# Patient Record
Sex: MALE | Race: OTHER | Employment: UNEMPLOYED | URBAN - METROPOLITAN AREA
[De-identification: names, ages, dates, MRNs, and addresses within clinical notes are randomized per-mention and may not be internally consistent; named-entity substitution may affect disease eponyms.]

---

## 2017-03-17 ENCOUNTER — OFFICE VISIT (OUTPATIENT)
Dept: INTERNAL MEDICINE CLINIC | Age: 52
End: 2017-03-17

## 2017-03-17 VITALS
RESPIRATION RATE: 16 BRPM | BODY MASS INDEX: 24.95 KG/M2 | OXYGEN SATURATION: 98 % | TEMPERATURE: 97.8 F | WEIGHT: 164.6 LBS | DIASTOLIC BLOOD PRESSURE: 64 MMHG | HEIGHT: 68 IN | SYSTOLIC BLOOD PRESSURE: 108 MMHG | HEART RATE: 48 BPM

## 2017-03-17 DIAGNOSIS — Z00.00 PHYSICAL EXAM: ICD-10-CM

## 2017-03-17 DIAGNOSIS — M77.8 RIGHT ELBOW TENDINITIS: Primary | ICD-10-CM

## 2017-03-17 DIAGNOSIS — L40.9 PSORIASIS: ICD-10-CM

## 2017-03-17 DIAGNOSIS — B86 SCABIES INFESTATION: ICD-10-CM

## 2017-03-17 RX ORDER — PREDNISONE 20 MG/1
TABLET ORAL
Qty: 13 TAB | Refills: 0 | Status: SHIPPED | OUTPATIENT
Start: 2017-03-17 | End: 2022-06-24

## 2017-03-17 RX ORDER — AMPICILLIN TRIHYDRATE 250 MG
600 CAPSULE ORAL
COMMUNITY
End: 2022-06-24

## 2017-03-17 RX ORDER — CALCIPOTRIENE 50 UG/G
OINTMENT TOPICAL 2 TIMES DAILY
Qty: 60 G | Refills: 0 | Status: SHIPPED | OUTPATIENT
Start: 2017-03-17 | End: 2022-06-24

## 2017-03-17 RX ORDER — GLUCOSAMINE SULFATE 1500 MG
POWDER IN PACKET (EA) ORAL DAILY
COMMUNITY
End: 2022-06-24

## 2017-03-17 RX ORDER — GLUCOSAM/CHONDRO/HERB 149/HYAL 750-100 MG
TABLET ORAL
COMMUNITY
End: 2022-06-24

## 2017-03-17 RX ORDER — PERMETHRIN 92 %
LIQUID (GRAM) MISCELLANEOUS
Qty: 1 G | Refills: 1 | Status: SHIPPED | OUTPATIENT
Start: 2017-03-17 | End: 2022-06-24

## 2017-03-17 NOTE — PROGRESS NOTES
Chief Complaint   Patient presents with    Complete Physical     patient has scabbies just like wife. wife was seen and is being seen for the same problem.

## 2017-03-17 NOTE — PROGRESS NOTES
Written by Vikki Cisneros, as dictated by Dr. Meir Bull MD.    Edna Moe is a 54 y.o. male. HPI  The patient comes in today C/O rash due to scabies that he has from his wife. He has been using cream from his wife but no relief , in fact rash is spreading. 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 He has had pain before in his L elbow and I gave him Naproxen and steroids when his Naproxen did not help his pain. He also has a quarter size rash on the front of his leg, that was on the back of his leg as well. He has seen similar rash in the past but now it  is staying longer. 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 pylori infection A04.8        Current Outpatient Prescriptions on File Prior to Visit   Medication Sig Dispense Refill    methylPREDNISolone (MEDROL DOSEPACK) 4 mg tablet As directed. 1 Dose Pack 0     No current facility-administered medications on file prior to visit. No Known Allergies    History reviewed. No pertinent past medical history. History reviewed. No pertinent surgical history. Family History   Problem Relation Age of Onset    Asthma Mother        Social History     Social History    Marital status:      Spouse name: N/A    Number of children: N/A    Years of education: N/A     Occupational History    Not on file. Social History Main Topics    Smoking status: Never Smoker    Smokeless tobacco: Not on file    Alcohol use No    Drug use: No    Sexual activity: Yes     Partners: Female     Other Topics Concern    Not on file     Social History Narrative         Review of Systems   Constitutional: Negative for malaise/fatigue. HENT: Negative for congestion. Respiratory: Negative for cough and wheezing. Cardiovascular: Negative for chest pain and palpitations.    Musculoskeletal: Positive for joint pain. Negative for myalgias. Skin: Positive for itching and rash. Neurological: Negative for weakness and headaches. Visit Vitals    /64 (BP 1 Location: Right arm, BP Patient Position: Sitting)    Pulse (!) 48    Temp 97.8 °F (36.6 °C) (Oral)    Resp 16    Ht 5' 8\" (1.727 m)    Wt 164 lb 9.6 oz (74.7 kg)    SpO2 98%    BMI 25.03 kg/m2     Physical Exam   Constitutional: He is oriented to person, place, and time. He appears well-nourished. No distress. HENT:   Right Ear: External ear normal.   Left Ear: External ear normal.   Mouth/Throat: Oropharynx is clear and moist.   Eyes: Conjunctivae and EOM are normal. Right eye exhibits no discharge. Left eye exhibits no discharge. Neck: Normal range of motion. Neck supple. Cardiovascular: Normal rate and regular rhythm. Pulmonary/Chest: Effort normal and breath sounds normal. He has no wheezes. Abdominal: Soft. Bowel sounds are normal. He exhibits no distension. Lymphadenopathy:     He has no cervical adenopathy. Neurological: He is alert and oriented to person, place, and time. Skin: Rash noted. There is erythema. pruritic erythematous rash   Psychiatric: He has a normal mood and affect. Nursing note and vitals reviewed. ASSESSMENT and PLAN    ICD-10-CM ICD-9-CM    1. Right elbow tendinitis M77.8 727.09 predniSONE (DELTASONE) 20 mg tablet sent to pharmacy     I will give him steroids. I want the patient to take the medication po as follows: 3 pills x 2 days, 2 pills x 2 days, 1 pill x 1 day and 1/2 pill x 1 day. The patient was advised to take this medication with food   2. Physical exam Z00.00 V70.9 LIPID PANEL      CBC W/O DIFF      METABOLIC PANEL, COMPREHENSIVE      TSH 3RD GENERATION   3. Psoriasis L40.9 696.1 calcipotriene (DOVONOX) 0.005 % ointment sent to pharmacy    I will give him a cream to use on this rash BID. I discussed that I am concerned about psoriasis since he also has a Hx of joint pain.  I discussed if that helps him with the rash then it is psoriasis and we will need to make a long term plan. 4. Scabies infestation B86 133.0 Permethrin, Bulk, 92 % liqd sent to pharmacy    I want him to get rid of the scabies while using this wash that he has to put on his body and then take a shower 6-8 hours later. This plan was reviewed with the patient and patient agrees. All questions were answered. This scribe documentation was reviewed by me and accurately reflects the examination and decisions made by me. This note will not be viewable in [address]

## 2017-03-17 NOTE — MR AVS SNAPSHOT
Visit Information Date & Time Provider Department Dept. Phone Encounter #  
 3/17/2017 11:45 AM Naya Ann MD [facility] Internal Medicine [phone] [id number] Your Appointments 3/17/2017 11:45 AM  
ROUTINE CARE with Naya Ann MD  
[facility] Internal Medicine [address] [facility]) Appt Note: pain in leg, spot is getting bigger [facility] [address] [place] [id number] Upcoming Health Maintenance Date Due DTaP/Tdap/Td series (1 - Tdap) 10/4/1988 Allergies as of 3/17/2017  Review Complete On: 3/17/2017 By: Naya Ann MD  
 No Known Allergies Current Immunizations  Never Reviewed No immunizations on file. Not reviewed this visit You Were Diagnosed With   
  
 Codes Comments Right elbow tendinitis    -  Primary ICD-10-CM: M77.8 ICD-9-CM: 727.09 Physical exam     ICD-10-CM: Z00.00 ICD-9-CM: V70.9 Psoriasis     ICD-10-CM: L40.9 ICD-9-CM: 696.1 Scabies infestation     ICD-10-CM: B86 
ICD-9-CM: 133.0 Vitals BP Pulse Temp Resp Height(growth percentile) Weight(growth percentile) 108/64 (BP 1 Location: Right arm, BP Patient Position: Sitting) (!) 48 97.8 °F (36.6 °C) (Oral) 16 5' 8\" (1.727 m) 164 lb 9.6 oz (74.7 kg) SpO2 BMI Smoking Status 98% 25.03 kg/m2 Never Smoker BMI and BSA Data Body Mass Index Body Surface Area 25.03 kg/m 2 1.89 m 2 Preferred Pharmacy Pharmacy Name Phone [facility]ACY #[id number]- [facility], [address] [place] [address] [phone] Your Updated Medication List  
  
   
This list is accurate as of: 3/17/17  9:22 AM.  Always use your most recent med list.  
  
  
  
  
 calcipotriene 0.005 % ointment Commonly known as:  Lorel Castellanos Apply  to affected area two (2) times a day. Omega-3-DHA-EPA-Fish Oil 1,000 mg (120 mg-180 mg) Cap Take  by mouth. Permethrin (Bulk) 92 % Liqd Apply daily X 3 days. predniSONE 20 mg tablet Commonly known as:  Mirtha Carson Prednisone 60 mg x 2 days, 40 mg po x 2 days, 20 mg po x 2 days, 10 mg po x 1 day. red yeast rice extract 600 mg Cap Take 600 mg by mouth now. VITAMIN D3 1,000 unit Cap Generic drug:  cholecalciferol Take  by mouth daily. Prescriptions Sent to Pharmacy Refills Permethrin, Bulk, 92 % liqd 1 Sig: Apply daily X 3 days. Class: Normal  
 Pharmacy: [facility]/pharmacy #[id number], [address] [place] [address] Ph #: [phone]  
 predniSONE (DELTASONE) 20 mg tablet 0 Sig: Prednisone 60 mg x 2 days, 40 mg po x 2 days, 20 mg po x 2 days, 10 mg po x 1 day. Class: Normal  
 Pharmacy: [facility]/pharmacy #[id number], [address] [place] [address] Ph #: [phone]  
 calcipotriene (DOVONOX) 0.005 % ointment 0 Sig: Apply  to affected area two (2) times a day. Class: Normal  
 Pharmacy: [facility]/pharmacy #[id number], [address] [place] [address] Ph #: [phone] Route: Topical  
  
We Performed the Following CBC W/O DIFF [49141 CPT(R)] LIPID PANEL [51918 CPT(R)] METABOLIC PANEL, COMPREHENSIVE [23099 CPT(R)] TSH 3RD GENERATION [63808 CPT(R)] Introducing [facility] & HEALTH SERVICES! [place] introduces Nexi patient portal. Now you can access parts of your medical record, email your doctor's office, and request medication refills online. 1. In your internet browser, go to [email or web address] 2. Click on the First Time User? Click Here link in the Sign In box. You will see the New Member Sign Up page. 3. Enter your Nexi Access Code exactly as it appears below. You will not need to use this code after youve completed the sign-up process. If you do not sign up before the expiration date, you must request a new code. · OVGuide Access Code: [email or web address] Expires: 6/15/2017  9:21 AM 
 
4. Enter the last four digits of your Social Security Number (xxxx) and Date of Birth (mm/dd/yyyy) as indicated and click Submit. You will be taken to the next sign-up page. 5. Create a OVGuide ID. This will be your OVGuide login ID and cannot be changed, so think of one that is secure and easy to remember. 6. Create a OVGuide password. You can change your password at any time. 7. Enter your Password Reset Question and Answer. This can be used at a later time if you forget your password. 8. Enter your e-mail address. You will receive e-mail notification when new information is available in [address] 9. Click Sign Up. You can now view and download portions of your medical record. 10. Click the Download Summary menu link to download a portable copy of your medical information. If you have questions, please visit the Frequently Asked Questions section of the OVGuide website. Remember, OVGuide is NOT to be used for urgent needs. For medical emergencies, dial 911. Now available from your iPhone and Android! Please provide this summary of care documentation to your next provider. Your primary care clinician is listed as Myriam Rayo. If you have any questions after today's visit, please call [phone].

## 2017-03-18 LAB
ALBUMIN SERPL-MCNC: 4.3 G/DL (ref 3.5–5.5)
ALBUMIN/GLOB SERPL: 1.9 {RATIO} (ref 1.2–2.2)
ALP SERPL-CCNC: 60 IU/L (ref 39–117)
ALT SERPL-CCNC: 31 IU/L (ref 0–44)
AST SERPL-CCNC: 24 IU/L (ref 0–40)
BILIRUB SERPL-MCNC: 0.4 MG/DL (ref 0–1.2)
BUN SERPL-MCNC: 19 MG/DL (ref 6–24)
BUN/CREAT SERPL: 20 (ref 9–20)
CALCIUM SERPL-MCNC: 8.9 MG/DL (ref 8.7–10.2)
CHLORIDE SERPL-SCNC: 102 MMOL/L (ref 96–106)
CHOLEST SERPL-MCNC: 165 MG/DL (ref 100–199)
CO2 SERPL-SCNC: 26 MMOL/L (ref 18–29)
CREAT SERPL-MCNC: 0.93 MG/DL (ref 0.76–1.27)
ERYTHROCYTE [DISTWIDTH] IN BLOOD BY AUTOMATED COUNT: 14 % (ref 12.3–15.4)
GLOBULIN SER CALC-MCNC: 2.3 G/DL (ref 1.5–4.5)
GLUCOSE SERPL-MCNC: 95 MG/DL (ref 65–99)
HCT VFR BLD AUTO: 43.7 % (ref 37.5–51)
HDLC SERPL-MCNC: 38 MG/DL
HGB BLD-MCNC: 14.6 G/DL (ref 12.6–17.7)
INTERPRETATION, 910389: NORMAL
LDLC SERPL CALC-MCNC: 89 MG/DL (ref 0–99)
MCH RBC QN AUTO: 30 PG (ref 26.6–33)
MCHC RBC AUTO-ENTMCNC: 33.4 G/DL (ref 31.5–35.7)
MCV RBC AUTO: 90 FL (ref 79–97)
PLATELET # BLD AUTO: 199 X10E3/UL (ref 150–379)
POTASSIUM SERPL-SCNC: 4.5 MMOL/L (ref 3.5–5.2)
PROT SERPL-MCNC: 6.6 G/DL (ref 6–8.5)
RBC # BLD AUTO: 4.86 X10E6/UL (ref 4.14–5.8)
SODIUM SERPL-SCNC: 140 MMOL/L (ref 134–144)
TRIGL SERPL-MCNC: 191 MG/DL (ref 0–149)
TSH SERPL DL<=0.005 MIU/L-ACNC: 2.35 UIU/ML (ref 0.45–4.5)
VLDLC SERPL CALC-MCNC: 38 MG/DL (ref 5–40)
WBC # BLD AUTO: 5.9 X10E3/UL (ref 3.4–10.8)

## 2017-03-22 NOTE — PROGRESS NOTES
Spoke with wife who is on HIPPA release form,  She voiced understanding and no concerns at this time.

## 2017-03-28 DIAGNOSIS — R21 RASH: Primary | ICD-10-CM

## 2017-03-29 ENCOUNTER — OFFICE VISIT (OUTPATIENT)
Dept: INTERNAL MEDICINE CLINIC | Age: 52
End: 2017-03-29

## 2017-03-29 VITALS
HEIGHT: 68 IN | WEIGHT: 148.8 LBS | DIASTOLIC BLOOD PRESSURE: 62 MMHG | OXYGEN SATURATION: 98 % | SYSTOLIC BLOOD PRESSURE: 110 MMHG | TEMPERATURE: 98.3 F | BODY MASS INDEX: 22.55 KG/M2 | HEART RATE: 62 BPM | RESPIRATION RATE: 16 BRPM

## 2017-03-29 DIAGNOSIS — G89.29 ELBOW PAIN, CHRONIC, RIGHT: ICD-10-CM

## 2017-03-29 DIAGNOSIS — M25.521 ELBOW PAIN, CHRONIC, RIGHT: ICD-10-CM

## 2017-03-29 DIAGNOSIS — L40.9 PSORIASIS: Primary | ICD-10-CM

## 2017-03-29 NOTE — MR AVS SNAPSHOT
Visit Information Date & Time Provider Department Dept. Phone Encounter #  
 3/29/2017  2:45 PM Kevin Lee MD [facility] Internal Medicine [phone] [id number] Upcoming Health Maintenance Date Due DTaP/Tdap/Td series (1 - Tdap) 10/4/1988 Allergies as of 3/29/2017  Review Complete On: 3/29/2017 By: Kevin Lee MD  
 No Known Allergies Current Immunizations  Never Reviewed No immunizations on file. Not reviewed this visit You Were Diagnosed With   
  
 Codes Comments Psoriasis    -  Primary ICD-10-CM: L40.9 ICD-9-CM: 696.1 Elbow pain, chronic, right     ICD-10-CM: M25.521, G89.29 ICD-9-CM: 719.42, 338.29 Vitals BP Pulse Temp Resp Height(growth percentile) Weight(growth percentile) 110/62 (BP 1 Location: Left arm, BP Patient Position: Sitting) 62 98.3 °F (36.8 °C) (Oral) 16 5' 8\" (1.727 m) 148 lb 12.8 oz (67.5 kg) SpO2 BMI Smoking Status 98% 22.62 kg/m2 Never Smoker BMI and BSA Data Body Mass Index Body Surface Area  
 22.62 kg/m 2 1.8 m 2 Preferred Pharmacy Pharmacy Name Phone [id number]- Eliezer Xiong, [facility] [place] [address] [phone] Your Updated Medication List  
  
   
This list is accurate as of: 3/29/17  3:05 PM.  Always use your most recent med list.  
  
  
  
  
 calcipotriene 0.005 % ointment Commonly known as:  Toyin Barrier Apply  to affected area two (2) times a day. Omega-3-DHA-EPA-Fish Oil 1,000 mg (120 mg-180 mg) Cap Take  by mouth. Permethrin (Bulk) 92 % Liqd Apply daily X 3 days. predniSONE 20 mg tablet Commonly known as:  Rocky Levine Prednisone 60 mg x 2 days, 40 mg po x 2 days, 20 mg po x 2 days, 10 mg po x 1 day. red yeast rice extract 600 mg Cap Take 600 mg by mouth now. VITAMIN D3 1,000 unit Cap Generic drug:  cholecalciferol Take  by mouth daily. We Performed the Following REFERRAL TO RHEUMATOLOGY [VAH24 Custom] Comments:  
 Please evaluate patient for possible psoriasis Referral Information Referral ID Referred By Referred To  
  
 [id number] Abby FRANKS MD   
   [address], [address] Phone: [phone] Fax: [phone] Visits Status Start Date End Date 1 New Request 3/29/17 3/29/18 If your referral has a status of pending review or denied, additional information will be sent to support the outcome of this decision. Introducing [facility] & HEALTH SERVICES! Crystal Baig introduces Money On Mobile patient portal. Now you can access parts of your medical record, email your doctor's office, and request medication refills online. 1. In your internet browser, go to [email or web address] 2. Click on the First Time User? Click Here link in the Sign In box. You will see the New Member Sign Up page. 3. Enter your Money On Mobile Access Code exactly as it appears below. You will not need to use this code after youve completed the sign-up process. If you do not sign up before the expiration date, you must request a new code. · [employer] On Mobile Access Code: [email or web address] Expires: 6/15/2017  9:21 AM 
 
4. Enter the last four digits of your Social Security Number (xxxx) and Date of Birth (mm/dd/yyyy) as indicated and click Submit. You will be taken to the next sign-up page. 5. Create a Money On Mobile ID. This will be your Money On Mobile login ID and cannot be changed, so think of one that is secure and easy to remember. 6. Create a Money On Mobile password. You can change your password at any time. 7. Enter your Password Reset Question and Answer. This can be used at a later time if you forget your password. 8. Enter your e-mail address. You will receive e-mail notification when new information is available in [address] 9. Click Sign Up. You can now view and download portions of your medical record. 10. Click the Download Summary menu link to download a portable copy of your medical information. If you have questions, please visit the Frequently Asked Questions section of the [employer] website. Remember, [employer] is NOT to be used for urgent needs. For medical emergencies, dial 911. Now available from your iPhone and Android! Please provide this summary of care documentation to your next provider. Your primary care clinician is listed as Bennett Mancera. If you have any questions after today's visit, please call [phone].

## 2017-03-29 NOTE — PROGRESS NOTES
Written by Audi Richard, as dictated by Dr. Yogesh Cai MD.  Translation was provided by Brandon Reese, certified , [employer] employee. HISTORY OF PRESENT ILLNESS  Donnell Paul is a 54 y.o. male. HPI  The patient comes in today C/O rash. He has been having a lot of itching. Recall he and his wife came in and they were diagnosed with scabies and given premarin wash. His wife is no longer having itching. He has itching and rash on his R leg and L arm. He used the Dovonox cream which helped the rash and then 4 days ago it came back. He uses the Dovonex cream every morning and every night. 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 pylori infection A04.8        Current Outpatient Prescriptions on File Prior to Visit   Medication Sig Dispense Refill    red yeast rice extract 600 mg cap Take 600 mg by mouth now.  cholecalciferol (VITAMIN D3) 1,000 unit cap Take  by mouth daily.  Omega-3-DHA-EPA-Fish Oil 1,000 mg (120 mg-180 mg) cap Take  by mouth.  Permethrin, Bulk, 92 % liqd Apply daily X 3 days. 1 g 1    predniSONE (DELTASONE) 20 mg tablet Prednisone 60 mg x 2 days, 40 mg po x 2 days, 20 mg po x 2 days, 10 mg po x 1 day. 13 Tab 0    calcipotriene (DOVONOX) 0.005 % ointment Apply  to affected area two (2) times a day. 60 g 0     No current facility-administered medications on file prior to visit. No Known Allergies    History reviewed. No pertinent past medical history. History reviewed. No pertinent surgical history. Family History   Problem Relation Age of Onset    Asthma Mother        Social History     Social History    Marital status:      Spouse name: N/A    Number of children: N/A    Years of education: N/A     Occupational History    Not on file.      Social History Main Topics    Smoking status: Never Smoker    Smokeless tobacco: Not on file    Alcohol use No    Drug use: No    Sexual activity: Yes     Partners: Female     Other Topics Concern    Not on file     Social History Narrative       Office Visit on 03/17/2017   Component Date Value Ref Range Status    Cholesterol, total 03/17/2017 165  100 - 199 mg/dL Final    Triglyceride 03/17/2017 191* 0 - 149 mg/dL Final    HDL Cholesterol 03/17/2017 38* >39 mg/dL Final    VLDL, calculated 03/17/2017 38  5 - 40 mg/dL Final    LDL, calculated 03/17/2017 89  0 - 99 mg/dL Final    WBC 03/17/2017 5.9  3.4 - 10.8 x10E3/uL Final    RBC 03/17/2017 4.86  4.14 - 5.80 x10E6/uL Final    HGB 03/17/2017 14.6  12.6 - 17.7 g/dL Final    HCT 03/17/2017 43.7  37.5 - 51.0 % Final    MCV 03/17/2017 90  79 - 97 fL Final    MCH 03/17/2017 30.0  26.6 - 33.0 pg Final    MCHC 03/17/2017 33.4  31.5 - 35.7 g/dL Final    RDW 03/17/2017 14.0  12.3 - 15.4 % Final    PLATELET 17/60/1135 124  150 - 379 x10E3/uL Final    Glucose 03/17/2017 95  65 - 99 mg/dL Final    BUN 03/17/2017 19  6 - 24 mg/dL Final    Creatinine 03/17/2017 0.93  0.76 - 1.27 mg/dL Final    GFR est non-AA 03/17/2017 96  >59 mL/min/1.73 Final    GFR est AA 03/17/2017 111  >59 mL/min/1.73 Final    BUN/Creatinine ratio 03/17/2017 20  9 - 20 Final    Sodium 03/17/2017 140  134 - 144 mmol/L Final    Potassium 03/17/2017 4.5  3.5 - 5.2 mmol/L Final    Chloride 03/17/2017 102  96 - 106 mmol/L Final    CO2 03/17/2017 26  18 - 29 mmol/L Final    Calcium 03/17/2017 8.9  8.7 - 10.2 mg/dL Final    Protein, total 03/17/2017 6.6  6.0 - 8.5 g/dL Final    Albumin 03/17/2017 4.3  3.5 - 5.5 g/dL Final    GLOBULIN, TOTAL 03/17/2017 2.3  1.5 - 4.5 g/dL Final    A-G Ratio 03/17/2017 1.9  1.2 - 2.2 Final                  **Please note reference interval change**    Bilirubin, total 03/17/2017 0.4  0.0 - 1.2 mg/dL Final    Alk.  phosphatase 03/17/2017 60  39 - 117 IU/L Final    AST (SGOT) 03/17/2017 24  0 - 40 IU/L Final    ALT (SGPT) 03/17/2017 31  0 - 44 IU/L Final    TSH 03/17/2017 2.350  0.450 - 4.500 uIU/mL Final    INTERPRETATION 03/17/2017 Note   Final    Supplement report is available. Review of Systems   Constitutional: Negative for malaise/fatigue. HENT: Negative for congestion. Respiratory: Negative for cough and wheezing. Cardiovascular: Negative for chest pain and palpitations. Musculoskeletal: Positive for joint pain. Negative for myalgias. Skin: Positive for itching and rash. Neurological: Negative for weakness and headaches. Visit Vitals    /62 (BP 1 Location: Left arm, BP Patient Position: Sitting)    Pulse 62    Temp 98.3 °F (36.8 °C) (Oral)    Resp 16    Ht 5' 8\" (1.727 m)    Wt 148 lb 12.8 oz (67.5 kg)    SpO2 98%    BMI 22.62 kg/m2     Physical Exam   Constitutional: He is oriented to person, place, and time. He appears well-nourished. No distress. HENT:   Right Ear: External ear normal.   Left Ear: External ear normal.   Mouth/Throat: Oropharynx is clear and moist.   Eyes: Conjunctivae and EOM are normal. Right eye exhibits no discharge. Left eye exhibits no discharge. Neck: Normal range of motion. Neck supple. Cardiovascular: Normal rate and regular rhythm. Pulmonary/Chest: Effort normal and breath sounds normal. He has no wheezes. Abdominal: Soft. Bowel sounds are normal. He exhibits no distension. Lymphadenopathy:     He has no cervical adenopathy. Neurological: He is alert and oriented to person, place, and time. Skin: Rash noted. Psoriatic, scaly, raised, erythematous rash on L leg and R arm   Psychiatric: He has a normal mood and affect. Nursing note and vitals reviewed. ASSESSMENT and PLAN    ICD-10-CM ICD-9-CM    1. Psoriasis L40.9 696.1 REFERRAL TO RHEUMATOLOGY    I discussed that he can use 1% hydrocortisone cream during the day between the Dovonox cream. I discussed that he has a skin condition and autoimmune disease, Psoriasis. The rash and joint pain is due to the Psoriasis.  The rash is treated with the cream. I want him to follow with rheumatologist to help manage the psoriasis sxs. 2. Elbow pain, chronic, right M25.521 719.42 REFERRAL TO RHEUMATOLOGY    G89.29 338.29   I discussed that his joint pain can also be explained by this autoimmune disease. This plan was reviewed with the patient and patient agrees. All questions were answered. This scribe documentation was reviewed by me and accurately reflects the examination and decisions made by me. This note will not be viewable in [address]

## 2017-03-29 NOTE — PROGRESS NOTES
Chief Complaint   Patient presents with    Rash     patient still has rash, was seen last week for scabbies. states that it is still having problems.

## 2017-04-10 ENCOUNTER — DOCUMENTATION ONLY (OUTPATIENT)
Dept: INTERNAL MEDICINE CLINIC | Age: 52
End: 2017-04-10

## 2017-04-10 NOTE — PROGRESS NOTES
Wife called for  states he needs pain medication for his elbow, writer explained this office does not provide chronic pain medication, if this patient needs further medication he would need another appointment. Writer offered x3 to make an appointment for this patient all attempts were refused by wife.

## 2017-05-03 ENCOUNTER — TELEPHONE (OUTPATIENT)
Dept: RHEUMATOLOGY | Age: 52
End: 2017-05-03

## 2017-05-09 ENCOUNTER — TELEPHONE (OUTPATIENT)
Dept: RHEUMATOLOGY | Age: 52
End: 2017-05-09

## 2022-05-13 PROCEDURE — 99283 EMERGENCY DEPT VISIT LOW MDM: CPT

## 2022-05-14 ENCOUNTER — HOSPITAL ENCOUNTER (EMERGENCY)
Age: 57
Discharge: HOME | End: 2022-05-14
Attending: EMERGENCY MEDICINE
Payer: OTHER MISCELLANEOUS

## 2022-05-14 VITALS
HEIGHT: 67 IN | DIASTOLIC BLOOD PRESSURE: 75 MMHG | BODY MASS INDEX: 22.76 KG/M2 | HEART RATE: 61 BPM | RESPIRATION RATE: 18 BRPM | TEMPERATURE: 98 F | SYSTOLIC BLOOD PRESSURE: 121 MMHG | OXYGEN SATURATION: 95 % | WEIGHT: 145 LBS

## 2022-05-14 DIAGNOSIS — K40.90 UNILATERAL INGUINAL HERNIA WITHOUT OBSTRUCTION OR GANGRENE, RECURRENCE NOT SPECIFIED: Primary | ICD-10-CM

## 2022-05-14 RX ORDER — MELOXICAM 15 MG/1
15 TABLET ORAL
Qty: 15 TABLET | Refills: 0 | Status: SHIPPED | OUTPATIENT
Start: 2022-05-14 | End: 2022-06-24

## 2022-05-14 NOTE — Clinical Note
Angelina Adair was seen and treated in our emergency department on 5/13/2022.     He will can return to work starting on 5/16/2022 with the following restrictions until 6/1/2022:  No lifting, carrying, pulling, or pushing objects greater than 40 pounds above the level of the waist    Brit Morton NP

## 2022-05-14 NOTE — ROUTINE PROCESS
Emergency Room Nursing Note        Patient Name: Deepak Taylor      : 1965             MRN: [id number]      Chief Complaint: Abdominal Pain      Admit Diagnosis: No admission diagnoses are documented for this encounter. Surgery: * No surgery found *            MD reviewed discharge instructions and options with patient. Patient verbalized understanding. RN reviewed discharge instructions using teach back method. Patient ambulatory to exit without difficulty and no acute signs of distress. No complaints or needs expressed at this time. Patient counseled on medications prescribed at discharge (If prescribed). Vital signs stable. Patient to follow up with PCP/Specialist on the next business day for appointment. All questions answered by ER RN.           Lines:        Vitals: Patient Vitals for the past 12 hrs:   Temp Pulse Resp BP SpO2   22 0327 98 °F (36.7 °C) 61 18 121/75 95 %   22 2248 98.2 °F (36.8 °C) (!) 57 18 110/69 94 %         Signed by: Shiva Crespo RN, ELIUD, BSN, [facility]                                              2022 at 3:28 AM

## 2022-05-14 NOTE — ED TRIAGE NOTES
Patient arrived to the ER ambulatory from home with complaints of abdominal that started after he was catching a falling ladder that happened at work.

## 2022-05-14 NOTE — ED PROVIDER NOTES
16-year-old male presents the ER today for evaluation of abdominal pain. Patient states that earlier today he was in the active catching a falling ladder when he developed a sensation of right lower abdominal pain associated with a palpable bulge. He states that he laid down and relaxed and the bulge and pain quickly subsided. He denies a history of similar symptoms. No past medical history on file. No past surgical history on file. Family History:   Problem Relation Age of Onset    Asthma Mother        Social History     Socioeconomic History    Marital status:      Spouse name: Not on file    Number of children: Not on file    Years of education: Not on file    Highest education level: Not on file   Occupational History    Not on file   Tobacco Use    Smoking status: Never Smoker    Smokeless tobacco: Not on file   Substance and Sexual Activity    Alcohol use: No    Drug use: No    Sexual activity: Yes     Partners: Female   Other Topics Concern    Not on file   Social History Narrative    Not on file     Social Determinants of Health     Financial Resource Strain:     Difficulty of Paying Living Expenses: Not on file   Food Insecurity:     Worried About Running Out of Food in the Last Year: Not on file    Baljeet of Food in the Last Year: Not on file   Transportation Needs:     Lack of Transportation (Medical): Not on file    Lack of Transportation (Non-Medical):  Not on file   Physical Activity:     Days of Exercise per Week: Not on file    Minutes of Exercise per Session: Not on file   Stress:     Feeling of Stress : Not on file   Social Connections:     Frequency of Communication with Friends and Family: Not on file    Frequency of Social Gatherings with Friends and Family: Not on file    Attends Mormonism Services: Not on file    Active Member of Clubs or Organizations: Not on file    Attends Club or Organization Meetings: Not on file    Marital Status: Not on file   Intimate Partner Violence:     Fear of Current or Ex-Partner: Not on file    Emotionally Abused: Not on file    Physically Abused: Not on file    Sexually Abused: Not on file   Housing Stability:     Unable to Pay for Housing in the Last Year: Not on file    Number of [place] in the Last Year: Not on file    Unstable Housing in the Last Year: Not on file         ALLERGIES: Patient has no known allergies. Review of Systems   Constitutional: Negative for fever. HENT: Negative for sore throat. Eyes: Negative for visual disturbance. Respiratory: Negative for shortness of breath. Cardiovascular: Negative for palpitations. Gastrointestinal: Positive for abdominal pain. Negative for vomiting. Genitourinary: Negative for dysuria. Musculoskeletal: Negative for myalgias. Skin: Negative for rash. Neurological: Negative for dizziness. Psychiatric/Behavioral: Negative for dysphoric mood. Vitals:    05/13/22 2248   BP: 110/69   Pulse: (!) 57   Resp: 18   Temp: 98.2 °F (36.8 °C)   SpO2: 94%   Weight: 65.8 kg (145 lb)   Height: 5' 7\" (1.702 m)            Physical Exam  Vitals and nursing note reviewed. Constitutional:       Appearance: Normal appearance. HENT:      Head: Normocephalic. Eyes:      Extraocular Movements: Extraocular movements intact. Cardiovascular:      Rate and Rhythm: Normal rate. Pulmonary:      Effort: Pulmonary effort is normal.   Abdominal:      General: Abdomen is flat. There is no distension. Tenderness: There is no abdominal tenderness. Hernia: A hernia is present. Hernia is present in the right inguinal area. Comments: Palpable nontender hernia present in the right inguinal region. No erythema to skin. No warmth to palpation. Musculoskeletal:         General: Normal range of motion. Cervical back: Neck supple. Skin:     General: Skin is warm and dry. Coloration: Skin is not pale.    Neurological:      Mental Status: He is alert and oriented to person, place, and time. Gait: Gait normal.   Psychiatric:         Behavior: Behavior normal.          MDM      VITAL SIGNS:  No data found. LABS:  No results found for this or any previous visit (from the past 6 hour(s)). IMAGING:  No orders to display         Medications During Visit:  Medications - No data to display      DECISION MAKING:  Andrea Bernardo is a 49 y.o. male who comes in as above. Physical exam reveals a nontender non incarcerated right inguinal hernia. I suspect that the patient has probably had a hernia for quite some time and the action of catching the falling ladder caused it to temporarily worsen until it later self reduced. Patient does not have a family doctor or medical insurance. Recommended follow-up with one of the provided free clinics for initial management who can refer him to surgery as needed. Discussed return precautions for signs of incarceration, including worsening pain, erythema, warmth, fevers, nausea/vomiting, etc.    The clinical decision making for this encounter included ordering and interpreting the above diagnostic tests with comparison to prior studies that are within our EMR. Past medical and surgical histories were reviewed, as were records from recent outpatient and emergency department visits. The above results discussed and reviewed with the patient. Patient verbalized understanding of the care plan, including any changes to current outpatient medication regimen, discussed disease process, symptom control, and follow-up care. Return precautions reviewed. IMPRESSION:  1. Unilateral inguinal hernia without obstruction or gangrene, recurrence not specified        DISPOSITION:  Discharged      Current Discharge Medication List      START taking these medications    Details   meloxicam (MOBIC) 15 mg tablet Take 1 Tablet by mouth daily as needed for Pain.   Qty: 15 Tablet, Refills: 0  Start date: 5/14/2022              Follow-up Information     Follow up With Specialties Details Why Contact Info    [facility]    [address] [place] [address]    [address]    [address] [facility] [address]  [phone]    [place] [address]  [phone]    [facility]    [address] [facility] [address]  [phone]            The patient is asked to follow-up with their primary care provider in the next several days. They are to call tomorrow for an appointment. The patient is asked to return promptly for any increased concerns or worsening of symptoms. They can return to this emergency department or any other emergency department.       Procedures

## 2022-05-14 NOTE — Clinical Note
Rosita Hunt was seen and treated in our emergency department on 5/13/2022.     He will can return to work starting on 5/16/2022 with the following restrictions until 6/1/2022:  No lifting, carrying, pulling, or pushing objects greater than 40 pounds above the level of the waist    Stacey Rock NP

## 2022-05-18 ENCOUNTER — TELEPHONE (OUTPATIENT)
Dept: SURGERY | Age: 57
End: 2022-05-18

## 2022-05-18 ENCOUNTER — OFFICE VISIT (OUTPATIENT)
Dept: SURGERY | Age: 57
End: 2022-05-18
Payer: OTHER MISCELLANEOUS

## 2022-05-18 VITALS
TEMPERATURE: 98.3 F | OXYGEN SATURATION: 94 % | BODY MASS INDEX: 23.86 KG/M2 | SYSTOLIC BLOOD PRESSURE: 97 MMHG | HEIGHT: 67 IN | HEART RATE: 64 BPM | RESPIRATION RATE: 18 BRPM | WEIGHT: 152 LBS | DIASTOLIC BLOOD PRESSURE: 62 MMHG

## 2022-05-18 DIAGNOSIS — K40.90 RIGHT INGUINAL HERNIA: Primary | ICD-10-CM

## 2022-05-18 PROCEDURE — 99202 OFFICE O/P NEW SF 15 MIN: CPT | Performed by: SURGERY

## 2022-05-18 RX ORDER — DEXTROMETHORPHAN HYDROBROMIDE, GUAIFENESIN 5; 100 MG/5ML; MG/5ML
650 LIQUID ORAL EVERY 8 HOURS
COMMUNITY
End: 2022-06-24

## 2022-05-18 NOTE — LETTER
5/18/2022    Patient: Magali Hernandez   YOB: 1965   Date of Visit: 5/18/2022     Radha Lopez MD  [address] [place] [address]  [facility] [place]    Dear Radha Lopez MD,      Thank you for referring Mr. Magali Hernandez to [facility] for evaluation. My notes for this consultation are attached. If you have questions, please do not hesitate to call me. I look forward to following your patient along with you.       Sincerely,    Cinthia Razo MD

## 2022-05-18 NOTE — PROGRESS NOTES
Subjective:      Daisha Sandoval  is a 49 y.o. male who presents for evaluation of RT inguinal hernia. Pt requires tele- today. Pt reports catching falling ladder at work and feeling a pain to the RT groin. Pt endorses immediately noticing a small bulge in the area. He reports pain when coughing or sneezing. Pt was seen and assessed in the ED on 5/13/22 and referred to office. Past Medical History:   Diagnosis Date    Right inguinal hernia 5/18/2022       No past surgical history on file. Social History     Tobacco Use    Smoking status: Never Smoker    Smokeless tobacco: Never Used   Substance Use Topics    Alcohol use: No       Family History   Problem Relation Age of Onset    Asthma Mother        Current Outpatient Medications on File Prior to Visit   Medication Sig Dispense Refill    acetaminophen (TYLENOL) 650 mg TbER Take 650 mg by mouth every eight (8) hours.  meloxicam (MOBIC) 15 mg tablet Take 1 Tablet by mouth daily as needed for Pain. (Patient not taking: Reported on 5/18/2022) 15 Tablet 0    red yeast rice extract 600 mg cap Take 600 mg by mouth now. (Patient not taking: Reported on 5/18/2022)      cholecalciferol (VITAMIN D3) 1,000 unit cap Take  by mouth daily. (Patient not taking: Reported on 5/18/2022)      Omega-3-DHA-EPA-Fish Oil 1,000 mg (120 mg-180 mg) cap Take  by mouth. (Patient not taking: Reported on 5/18/2022)      Permethrin, Bulk, 92 % liqd Apply daily X 3 days. (Patient not taking: Reported on 5/18/2022) 1 g 1    predniSONE (DELTASONE) 20 mg tablet Prednisone 60 mg x 2 days, 40 mg po x 2 days, 20 mg po x 2 days, 10 mg po x 1 day. (Patient not taking: Reported on 5/18/2022) 13 Tab 0    calcipotriene (DOVONOX) 0.005 % ointment Apply  to affected area two (2) times a day. (Patient not taking: Reported on 5/18/2022) 60 g 0     No current facility-administered medications on file prior to visit.        No Known Allergies    Review of Systems:  Constitutional: No fever or chills  Neurologic: No headache  Eyes: No scleral icterus or irritated eyes  Nose: No nasal pain or drainage  Mouth: No oral lesions or sore throat  Cardiac: No palpations or chest pain  Pulmonary: No cough or shortness or breath  Gastrointestinal: No nausea, emesis, diarrhea, or constipation  Genitourinary: No dysuria  Musculoskeletal: No muscle or joint tenderness  Skin: No rashes or lesions  Psychiatric: No anxiety or depressed mood    Objective:     Visit Vitals  BP 97/62   Pulse 64   Temp 98.3 °F (36.8 °C) (Oral)   Resp 18   Ht 5' 7\" (1.702 m)   Wt 152 lb (68.9 kg)   SpO2 94%   BMI 23.81 kg/m²        Physical Exam:  General: No acute distress, conversant  Eyes: PERRLA, no scleral icterus  HENT: Normocephalic without oral lesions  Neck: Trachea midline without LAD  Cardiac: Normal pulse rate and rhythm  Pulmonary: Symmetric chest rise with normal effort  Abdomen: easily reducible, very tender, small RT inguinal hernia  Skin: Warm without rash  Extremities: No edema or joint stiffness  Psych: Appropriate mood and affect    Labs: No results found for this or any previous visit (from the past 24 hour(s)). Data Review: All previous imaging, testing and lab work was reviewed and interpreted. Assessment and Plan:       ICD-10-CM ICD-9-CM    1. Right inguinal hernia  K40.90 550.90        Discussed their diagnosis thoroughly. Noted that the presence of a hernia is not a determining factor when considering surgical repair. Due to the natural progression of a hernia, this will not heal on its own and may continue to increase in size over time. Since this hernia is bothersome, recommend surgical repair as an outpatient, with possible mesh placement. Described the details of this surgery and discussed what the patient should expect for recovery. Pt should avoid any heavy lifting for 10-14 days post-operation. All questions were answered.  They agree with this plan and will schedule this at their convenience. Total face to face time with patient: 15 minutes. Greater than 50% of the time was spent in counseling. This note was scribed by Dora Loges as dictated by Dr. Marianne Connelly.      Signed By: Pina Huber MD     05/18/22

## 2022-05-18 NOTE — PROGRESS NOTES
1. Have you been to the ER, urgent care clinic since your last visit? Hospitalized since your last visit? 5/14/22 [facility] for right groin pain. 2. Have you seen or consulted any other health care providers outside of the [address] [place] [address] since your last visit? Include any pap smears or colon screening. Mackenzie Olivia # [phone]. DC instructions

## 2022-05-18 NOTE — TELEPHONE ENCOUNTER
Called patient in attempt to move upcoming appointment to an earlier time per [facility] request. No answer, left voicemail.

## 2022-06-24 ENCOUNTER — HOSPITAL ENCOUNTER (OUTPATIENT)
Dept: PREADMISSION TESTING | Age: 57
Discharge: HOME | End: 2022-06-24
Payer: OTHER MISCELLANEOUS

## 2022-06-24 VITALS
SYSTOLIC BLOOD PRESSURE: 96 MMHG | BODY MASS INDEX: 21.85 KG/M2 | DIASTOLIC BLOOD PRESSURE: 61 MMHG | TEMPERATURE: 97.9 F | HEART RATE: 51 BPM | HEIGHT: 68 IN | WEIGHT: 144.18 LBS

## 2022-06-24 LAB
BASOPHILS # BLD: 0.1 K/UL (ref 0–0.1)
BASOPHILS NFR BLD: 1 % (ref 0–1)
DIFFERENTIAL METHOD BLD: ABNORMAL
EOSINOPHIL # BLD: 0.6 K/UL (ref 0–0.4)
EOSINOPHIL NFR BLD: 14 % (ref 0–7)
ERYTHROCYTE [DISTWIDTH] IN BLOOD BY AUTOMATED COUNT: 13.1 % (ref 11.5–14.5)
HCT VFR BLD AUTO: 43.7 % (ref 36.6–50.3)
HGB BLD-MCNC: 14.6 G/DL (ref 12.1–17)
IMM GRANULOCYTES # BLD AUTO: 0 K/UL (ref 0–0.04)
IMM GRANULOCYTES NFR BLD AUTO: 0 % (ref 0–0.5)
LYMPHOCYTES # BLD: 1.9 K/UL (ref 0.8–3.5)
LYMPHOCYTES NFR BLD: 43 % (ref 12–49)
MCH RBC QN AUTO: 29.3 PG (ref 26–34)
MCHC RBC AUTO-ENTMCNC: 33.4 G/DL (ref 30–36.5)
MCV RBC AUTO: 87.6 FL (ref 80–99)
MONOCYTES # BLD: 0.3 K/UL (ref 0–1)
MONOCYTES NFR BLD: 8 % (ref 5–13)
NEUTS SEG # BLD: 1.5 K/UL (ref 1.8–8)
NEUTS SEG NFR BLD: 34 % (ref 32–75)
NRBC # BLD: 0 K/UL (ref 0–0.01)
NRBC BLD-RTO: 0 PER 100 WBC
PLATELET # BLD AUTO: 191 K/UL (ref 150–400)
PMV BLD AUTO: 10.9 FL (ref 8.9–12.9)
RBC # BLD AUTO: 4.99 M/UL (ref 4.1–5.7)
WBC # BLD AUTO: 4.5 K/UL (ref 4.1–11.1)

## 2022-06-24 PROCEDURE — 85025 COMPLETE CBC W/AUTO DIFF WBC: CPT

## 2022-06-29 ENCOUNTER — ANESTHESIA EVENT (OUTPATIENT)
Dept: SURGERY | Age: 57
End: 2022-06-29
Payer: OTHER MISCELLANEOUS

## 2022-06-30 ENCOUNTER — HOSPITAL ENCOUNTER (OUTPATIENT)
Age: 57
Setting detail: OUTPATIENT SURGERY
Discharge: HOME | End: 2022-06-30
Attending: SURGERY | Admitting: SURGERY
Payer: OTHER MISCELLANEOUS

## 2022-06-30 ENCOUNTER — ANESTHESIA (OUTPATIENT)
Dept: SURGERY | Age: 57
End: 2022-06-30
Payer: OTHER MISCELLANEOUS

## 2022-06-30 VITALS
SYSTOLIC BLOOD PRESSURE: 125 MMHG | WEIGHT: 144 LBS | HEIGHT: 68 IN | OXYGEN SATURATION: 100 % | HEART RATE: 43 BPM | DIASTOLIC BLOOD PRESSURE: 72 MMHG | BODY MASS INDEX: 21.82 KG/M2 | RESPIRATION RATE: 16 BRPM | TEMPERATURE: 97.6 F

## 2022-06-30 DIAGNOSIS — K40.90 RIGHT INGUINAL HERNIA: ICD-10-CM

## 2022-06-30 DIAGNOSIS — G89.18 POST-OP PAIN: Primary | ICD-10-CM

## 2022-06-30 PROCEDURE — 77030008684 HC TU ET CUF COVD -B: Performed by: STUDENT IN AN ORGANIZED HEALTH CARE EDUCATION/TRAINING PROGRAM

## 2022-06-30 PROCEDURE — 74011250636 HC RX REV CODE- 250/636: Performed by: SURGERY

## 2022-06-30 PROCEDURE — 74011000250 HC RX REV CODE- 250: Performed by: SURGERY

## 2022-06-30 PROCEDURE — 77030013079 HC BLNKT BAIR HGGR 3M -A: Performed by: STUDENT IN AN ORGANIZED HEALTH CARE EDUCATION/TRAINING PROGRAM

## 2022-06-30 PROCEDURE — 74011000258 HC RX REV CODE- 258: Performed by: SURGERY

## 2022-06-30 PROCEDURE — 77030042556 HC PNCL CAUT -B: Performed by: SURGERY

## 2022-06-30 PROCEDURE — 77030040361 HC SLV COMPR DVT MDII -B: Performed by: SURGERY

## 2022-06-30 PROCEDURE — 76060000033 HC ANESTHESIA 1 TO 1.5 HR: Performed by: SURGERY

## 2022-06-30 PROCEDURE — 74011250636 HC RX REV CODE- 250/636: Performed by: STUDENT IN AN ORGANIZED HEALTH CARE EDUCATION/TRAINING PROGRAM

## 2022-06-30 PROCEDURE — 74011250637 HC RX REV CODE- 250/637: Performed by: STUDENT IN AN ORGANIZED HEALTH CARE EDUCATION/TRAINING PROGRAM

## 2022-06-30 PROCEDURE — 74011000250 HC RX REV CODE- 250: Performed by: STUDENT IN AN ORGANIZED HEALTH CARE EDUCATION/TRAINING PROGRAM

## 2022-06-30 PROCEDURE — 76210000016 HC OR PH I REC 1 TO 1.5 HR: Performed by: SURGERY

## 2022-06-30 PROCEDURE — 49505 PRP I/HERN INIT REDUC >5 YR: CPT | Performed by: SURGERY

## 2022-06-30 PROCEDURE — 77030002933 HC SUT MCRYL J&J -A: Performed by: SURGERY

## 2022-06-30 PROCEDURE — 88302 TISSUE EXAM BY PATHOLOGIST: CPT

## 2022-06-30 PROCEDURE — C1781 MESH (IMPLANTABLE): HCPCS | Performed by: SURGERY

## 2022-06-30 PROCEDURE — 2709999900 HC NON-CHARGEABLE SUPPLY: Performed by: SURGERY

## 2022-06-30 PROCEDURE — 77030002982 HC SUT POLYSRB J&J -A: Performed by: SURGERY

## 2022-06-30 PROCEDURE — 77030010507 HC ADH SKN DERMBND J&J -B: Performed by: SURGERY

## 2022-06-30 PROCEDURE — 76210000020 HC REC RM PH II FIRST 0.5 HR: Performed by: SURGERY

## 2022-06-30 PROCEDURE — C9290 INJ, BUPIVACAINE LIPOSOME: HCPCS | Performed by: SURGERY

## 2022-06-30 PROCEDURE — 77030031139 HC SUT VCRL2 J&J -A: Performed by: SURGERY

## 2022-06-30 PROCEDURE — 74011250636 HC RX REV CODE- 250/636

## 2022-06-30 PROCEDURE — 77030026438 HC STYL ET INTUB CARD -A: Performed by: STUDENT IN AN ORGANIZED HEALTH CARE EDUCATION/TRAINING PROGRAM

## 2022-06-30 PROCEDURE — 76010000149 HC OR TIME 1 TO 1.5 HR: Performed by: SURGERY

## 2022-06-30 DEVICE — PERFIX PLUG, 1.3" X 1.55" (3.3 CM X 3.9 CM), MEDIUM (CONTENTS: 2)
Type: IMPLANTABLE DEVICE | Site: OTHER | Status: FUNCTIONAL
Brand: PERFIX

## 2022-06-30 RX ORDER — FENTANYL CITRATE 50 UG/ML
50 INJECTION, SOLUTION INTRAMUSCULAR; INTRAVENOUS AS NEEDED
Status: DISCONTINUED | OUTPATIENT
Start: 2022-06-30 | End: 2022-06-30 | Stop reason: HOSPADM

## 2022-06-30 RX ORDER — PROPOFOL 10 MG/ML
INJECTION, EMULSION INTRAVENOUS AS NEEDED
Status: DISCONTINUED | OUTPATIENT
Start: 2022-06-30 | End: 2022-06-30 | Stop reason: HOSPADM

## 2022-06-30 RX ORDER — SODIUM CHLORIDE 0.9 % (FLUSH) 0.9 %
5-40 SYRINGE (ML) INJECTION EVERY 8 HOURS
Status: DISCONTINUED | OUTPATIENT
Start: 2022-06-30 | End: 2022-06-30 | Stop reason: HOSPADM

## 2022-06-30 RX ORDER — MIDAZOLAM HYDROCHLORIDE 1 MG/ML
INJECTION, SOLUTION INTRAMUSCULAR; INTRAVENOUS AS NEEDED
Status: DISCONTINUED | OUTPATIENT
Start: 2022-06-30 | End: 2022-06-30 | Stop reason: HOSPADM

## 2022-06-30 RX ORDER — FENTANYL CITRATE 50 UG/ML
25 INJECTION, SOLUTION INTRAMUSCULAR; INTRAVENOUS
Status: DISCONTINUED | OUTPATIENT
Start: 2022-06-30 | End: 2022-06-30 | Stop reason: HOSPADM

## 2022-06-30 RX ORDER — SODIUM CHLORIDE 0.9 % (FLUSH) 0.9 %
5-40 SYRINGE (ML) INJECTION AS NEEDED
Status: DISCONTINUED | OUTPATIENT
Start: 2022-06-30 | End: 2022-06-30 | Stop reason: HOSPADM

## 2022-06-30 RX ORDER — HYDROMORPHONE HYDROCHLORIDE 1 MG/ML
0.2 INJECTION, SOLUTION INTRAMUSCULAR; INTRAVENOUS; SUBCUTANEOUS
Status: DISCONTINUED | OUTPATIENT
Start: 2022-06-30 | End: 2022-06-30 | Stop reason: HOSPADM

## 2022-06-30 RX ORDER — MIDAZOLAM HYDROCHLORIDE 1 MG/ML
0.5 INJECTION, SOLUTION INTRAMUSCULAR; INTRAVENOUS
Status: DISCONTINUED | OUTPATIENT
Start: 2022-06-30 | End: 2022-06-30 | Stop reason: HOSPADM

## 2022-06-30 RX ORDER — ROCURONIUM BROMIDE 10 MG/ML
INJECTION, SOLUTION INTRAVENOUS AS NEEDED
Status: DISCONTINUED | OUTPATIENT
Start: 2022-06-30 | End: 2022-06-30 | Stop reason: HOSPADM

## 2022-06-30 RX ORDER — MIDAZOLAM HYDROCHLORIDE 1 MG/ML
1 INJECTION, SOLUTION INTRAMUSCULAR; INTRAVENOUS AS NEEDED
Status: DISCONTINUED | OUTPATIENT
Start: 2022-06-30 | End: 2022-06-30 | Stop reason: HOSPADM

## 2022-06-30 RX ORDER — OXYCODONE AND ACETAMINOPHEN 5; 325 MG/1; MG/1
1 TABLET ORAL AS NEEDED
Status: DISCONTINUED | OUTPATIENT
Start: 2022-06-30 | End: 2022-06-30 | Stop reason: HOSPADM

## 2022-06-30 RX ORDER — ACETAMINOPHEN 500 MG
1000 TABLET ORAL ONCE
Status: COMPLETED | OUTPATIENT
Start: 2022-06-30 | End: 2022-06-30

## 2022-06-30 RX ORDER — PHENYLEPHRINE HCL IN 0.9% NACL 0.4MG/10ML
SYRINGE (ML) INTRAVENOUS AS NEEDED
Status: DISCONTINUED | OUTPATIENT
Start: 2022-06-30 | End: 2022-06-30 | Stop reason: HOSPADM

## 2022-06-30 RX ORDER — LIDOCAINE HYDROCHLORIDE 20 MG/ML
INJECTION, SOLUTION EPIDURAL; INFILTRATION; INTRACAUDAL; PERINEURAL AS NEEDED
Status: DISCONTINUED | OUTPATIENT
Start: 2022-06-30 | End: 2022-06-30 | Stop reason: HOSPADM

## 2022-06-30 RX ORDER — DIPHENHYDRAMINE HYDROCHLORIDE 50 MG/ML
12.5 INJECTION, SOLUTION INTRAMUSCULAR; INTRAVENOUS AS NEEDED
Status: DISCONTINUED | OUTPATIENT
Start: 2022-06-30 | End: 2022-06-30 | Stop reason: HOSPADM

## 2022-06-30 RX ORDER — FENTANYL CITRATE 50 UG/ML
INJECTION, SOLUTION INTRAMUSCULAR; INTRAVENOUS AS NEEDED
Status: DISCONTINUED | OUTPATIENT
Start: 2022-06-30 | End: 2022-06-30 | Stop reason: HOSPADM

## 2022-06-30 RX ORDER — MORPHINE SULFATE 2 MG/ML
2 INJECTION, SOLUTION INTRAMUSCULAR; INTRAVENOUS
Status: DISCONTINUED | OUTPATIENT
Start: 2022-06-30 | End: 2022-06-30 | Stop reason: HOSPADM

## 2022-06-30 RX ORDER — GLYCOPYRROLATE 0.2 MG/ML
INJECTION INTRAMUSCULAR; INTRAVENOUS AS NEEDED
Status: DISCONTINUED | OUTPATIENT
Start: 2022-06-30 | End: 2022-06-30 | Stop reason: HOSPADM

## 2022-06-30 RX ORDER — SODIUM CHLORIDE, SODIUM LACTATE, POTASSIUM CHLORIDE, CALCIUM CHLORIDE 600; 310; 30; 20 MG/100ML; MG/100ML; MG/100ML; MG/100ML
INJECTION, SOLUTION INTRAVENOUS
Status: DISCONTINUED | OUTPATIENT
Start: 2022-06-30 | End: 2022-06-30 | Stop reason: HOSPADM

## 2022-06-30 RX ORDER — SODIUM CHLORIDE, SODIUM LACTATE, POTASSIUM CHLORIDE, CALCIUM CHLORIDE 600; 310; 30; 20 MG/100ML; MG/100ML; MG/100ML; MG/100ML
1000 INJECTION, SOLUTION INTRAVENOUS CONTINUOUS
Status: DISCONTINUED | OUTPATIENT
Start: 2022-06-30 | End: 2022-06-30 | Stop reason: HOSPADM

## 2022-06-30 RX ORDER — LIDOCAINE HYDROCHLORIDE 10 MG/ML
0.1 INJECTION, SOLUTION EPIDURAL; INFILTRATION; INTRACAUDAL; PERINEURAL AS NEEDED
Status: DISCONTINUED | OUTPATIENT
Start: 2022-06-30 | End: 2022-06-30 | Stop reason: HOSPADM

## 2022-06-30 RX ORDER — ONDANSETRON 2 MG/ML
4 INJECTION INTRAMUSCULAR; INTRAVENOUS AS NEEDED
Status: DISCONTINUED | OUTPATIENT
Start: 2022-06-30 | End: 2022-06-30 | Stop reason: HOSPADM

## 2022-06-30 RX ORDER — SODIUM CHLORIDE 9 MG/ML
1000 INJECTION, SOLUTION INTRAVENOUS CONTINUOUS
Status: DISCONTINUED | OUTPATIENT
Start: 2022-06-30 | End: 2022-06-30 | Stop reason: HOSPADM

## 2022-06-30 RX ORDER — NEOSTIGMINE METHYLSULFATE 1 MG/ML
INJECTION, SOLUTION INTRAVENOUS AS NEEDED
Status: DISCONTINUED | OUTPATIENT
Start: 2022-06-30 | End: 2022-06-30 | Stop reason: HOSPADM

## 2022-06-30 RX ORDER — SODIUM CHLORIDE 9 MG/ML
125 INJECTION, SOLUTION INTRAVENOUS CONTINUOUS
Status: DISCONTINUED | OUTPATIENT
Start: 2022-06-30 | End: 2022-06-30 | Stop reason: HOSPADM

## 2022-06-30 RX ORDER — SODIUM CHLORIDE, SODIUM LACTATE, POTASSIUM CHLORIDE, CALCIUM CHLORIDE 600; 310; 30; 20 MG/100ML; MG/100ML; MG/100ML; MG/100ML
125 INJECTION, SOLUTION INTRAVENOUS CONTINUOUS
Status: DISCONTINUED | OUTPATIENT
Start: 2022-06-30 | End: 2022-06-30 | Stop reason: HOSPADM

## 2022-06-30 RX ORDER — BUPIVACAINE HYDROCHLORIDE AND EPINEPHRINE 5; 5 MG/ML; UG/ML
30 INJECTION, SOLUTION EPIDURAL; INTRACAUDAL; PERINEURAL ONCE
Status: DISCONTINUED | OUTPATIENT
Start: 2022-06-30 | End: 2022-06-30 | Stop reason: HOSPADM

## 2022-06-30 RX ORDER — ONDANSETRON 2 MG/ML
INJECTION INTRAMUSCULAR; INTRAVENOUS AS NEEDED
Status: DISCONTINUED | OUTPATIENT
Start: 2022-06-30 | End: 2022-06-30 | Stop reason: HOSPADM

## 2022-06-30 RX ORDER — OXYCODONE AND ACETAMINOPHEN 5; 325 MG/1; MG/1
1 TABLET ORAL
Qty: 20 TABLET | Refills: 0 | Status: SHIPPED | OUTPATIENT
Start: 2022-06-30 | End: 2022-07-05

## 2022-06-30 RX ORDER — ROPIVACAINE HYDROCHLORIDE 5 MG/ML
30 INJECTION, SOLUTION EPIDURAL; INFILTRATION; PERINEURAL AS NEEDED
Status: DISCONTINUED | OUTPATIENT
Start: 2022-06-30 | End: 2022-06-30 | Stop reason: HOSPADM

## 2022-06-30 RX ORDER — DEXAMETHASONE SODIUM PHOSPHATE 4 MG/ML
INJECTION, SOLUTION INTRA-ARTICULAR; INTRALESIONAL; INTRAMUSCULAR; INTRAVENOUS; SOFT TISSUE AS NEEDED
Status: DISCONTINUED | OUTPATIENT
Start: 2022-06-30 | End: 2022-06-30 | Stop reason: HOSPADM

## 2022-06-30 RX ADMIN — PROPOFOL 150 MG: 10 INJECTION, EMULSION INTRAVENOUS at 08:39

## 2022-06-30 RX ADMIN — SODIUM CHLORIDE, POTASSIUM CHLORIDE, SODIUM LACTATE AND CALCIUM CHLORIDE: 600; 310; 30; 20 INJECTION, SOLUTION INTRAVENOUS at 08:35

## 2022-06-30 RX ADMIN — FENTANYL CITRATE 50 MCG: 50 INJECTION, SOLUTION INTRAMUSCULAR; INTRAVENOUS at 08:55

## 2022-06-30 RX ADMIN — MIDAZOLAM 2 MG: 1 INJECTION INTRAMUSCULAR; INTRAVENOUS at 08:33

## 2022-06-30 RX ADMIN — GLYCOPYRROLATE 0.2 MG: 0.2 INJECTION, SOLUTION INTRAMUSCULAR; INTRAVENOUS at 08:39

## 2022-06-30 RX ADMIN — FENTANYL CITRATE 50 MCG: 50 INJECTION, SOLUTION INTRAMUSCULAR; INTRAVENOUS at 08:39

## 2022-06-30 RX ADMIN — ROCURONIUM BROMIDE 50 MG: 10 SOLUTION INTRAVENOUS at 08:40

## 2022-06-30 RX ADMIN — ONDANSETRON HYDROCHLORIDE 4 MG: 2 INJECTION, SOLUTION INTRAMUSCULAR; INTRAVENOUS at 09:32

## 2022-06-30 RX ADMIN — WATER 2 G: 1 INJECTION INTRAMUSCULAR; INTRAVENOUS; SUBCUTANEOUS at 08:45

## 2022-06-30 RX ADMIN — GLYCOPYRROLATE 0.4 MG: 0.2 INJECTION, SOLUTION INTRAMUSCULAR; INTRAVENOUS at 09:32

## 2022-06-30 RX ADMIN — LIDOCAINE HYDROCHLORIDE 0.1 ML: 10 INJECTION, SOLUTION EPIDURAL; INFILTRATION; INTRACAUDAL; PERINEURAL at 08:27

## 2022-06-30 RX ADMIN — ACETAMINOPHEN 1000 MG: 500 TABLET ORAL at 08:22

## 2022-06-30 RX ADMIN — Medication 80 MCG: at 09:23

## 2022-06-30 RX ADMIN — LIDOCAINE HYDROCHLORIDE 60 MG: 20 INJECTION, SOLUTION EPIDURAL; INFILTRATION; INTRACAUDAL; PERINEURAL at 08:39

## 2022-06-30 RX ADMIN — DEXAMETHASONE SODIUM PHOSPHATE 4 MG: 4 INJECTION, SOLUTION INTRAMUSCULAR; INTRAVENOUS at 08:50

## 2022-06-30 RX ADMIN — NEOSTIGMINE METHYLSULFATE 3 MG: 1 INJECTION, SOLUTION INTRAVENOUS at 09:32

## 2022-06-30 RX ADMIN — SODIUM CHLORIDE, POTASSIUM CHLORIDE, SODIUM LACTATE AND CALCIUM CHLORIDE 125 ML/HR: 600; 310; 30; 20 INJECTION, SOLUTION INTRAVENOUS at 08:26

## 2022-06-30 NOTE — ANESTHESIA POSTPROCEDURE EVALUATION
Procedure(s):  RIGHT INGUINAL HERNIA REPAIR. general    Anesthesia Post Evaluation      Multimodal analgesia: multimodal analgesia used between 6 hours prior to anesthesia start to PACU discharge  Patient location during evaluation: bedside  Patient participation: complete - patient participated  Level of consciousness: awake  Pain score: 0  Pain management: satisfactory to patient  Airway patency: patent  Anesthetic complications: no  Cardiovascular status: acceptable and blood pressure returned to baseline  Respiratory status: acceptable  Hydration status: acceptable  Comments: I have evaluated the patient and meets criteria for discharge from PACU. Derik Donnelly DO. Post anesthesia nausea and vomiting:  none  Final Post Anesthesia Temperature Assessment:  Normothermia (36.0-37.5 degrees C)      INITIAL Post-op Vital signs:   Vitals Value Taken Time   /74 06/30/22 1018   Temp 36.7 °C (98 °F) 06/30/22 0951   Pulse 42 06/30/22 1027   Resp 16 06/30/22 1027   SpO2 100 % 06/30/22 1027   Vitals shown include unvalidated device data.

## 2022-06-30 NOTE — DISCHARGE INSTRUCTIONS
Patient Education        Reparación de hernia: Yuridia Kibler en el hogar  Hernia Repair: What to Expect at Home  Arroyo recuperación  Es probable que tenga dolor por algunos días. Es posible que [place] se sienta cansado y tenga menos energía de lo normal. [place] es común. Debería sentirse mejor después de unos días y es probable que se sienta mucho mejor en 7 días. Por varias semanas, es posible que al moverse sienta molestias o tirones en la reparación de la hernia. Ian vez tenga algunos moretones cerca del lugar de la reparación y en los genitales. [place] es normal. Si tiene [employer], podrían decirle que use ropa interior que le [place]. Los pantalones cortos de [place] de Spandex también pueden brindar buen soporte. Esta hoja de [employer] idea general del tiempo que le llevará recuperarse. Sin embargo, cada persona se recupera a un ritmo diferente. Siga los pasos que se mencionan a continuación para recuperarse lo más rápido posible. ¿Cómo puede cuidarse en el hogar? Actividad    · Descanse cuando se sienta fatigado. Dormir lo suficiente le ayudará a recuperarse.     · Intente caminar todos los días. Comience caminando un poco más de lo que caminó el día anterior. Poco a poco, aumente la distancia. Caminar aumenta el flujo de [place] a prevenir la neumonía y el estreñimiento.     · Evite las actividades vigorosas, aurelia montar en bicicleta, trotar, levantar pesas o hacer ejercicios aeróbicos, hasta que arroyo médico lo apruebe.     · Evite levantar objetos que pudieran implicar un esfuerzo. [place] podría incluir bolsas de compras pesadas y recipientes para Parisa Marrow o maletines pesados, bolsas de arena para excrementos de lorena o alimentos para perros, layne aspiradora o un sierra.     · Puede conducir cuando ya no esté tomando analgésicos (medicamentos para el dolor) y pueda desplazar con rapidez arroyo pie del acelerador al freno.  También debe estar en condiciones de poder permanecer sentado con comodidad melanie un tiempo polly, aun si no piensa ir muy lejos. Octavio Pererae atascado en el tráfico.     · La mayoría de personas pueden volver a trabajar 1 o 2 semanas después de la cirugía.     · Puede ducharse entre 24 y 50 horas después de la [place], si woodruff médico lo aprueba. Seque el clau (incisión) con toques suaves de toalla. No tome un baño de inmersión en [facility] las primeras 2 semanas, o hasta que woodruff médico lo apruebe.     · Woodruff médico le indicará cuándo puede volver a tener relaciones sexuales. Alimentación    · Puede continuar con woodruff alimentación normal. Si tiene malestar estomacal, pruebe a comer alimentos suaves bajos en grasas, aurelia arroz sin condimentar, ja a la shalini, pan so y yogur.     · Nubia abundantes líquidos (a menos que woodruff médico le indique lo contrario).     · Podría notar que no evacua el intestino con regularidad linda después de la cirugía. [place] es común. Evite el estreñimiento y hacer esfuerzos melanie la evacuación. Sería conveniente marcus un suplemento de [employer]. Si no ha evacuado el intestino después de un par de días, pregúntele a woodruff médico si puede marcus un laxante suave. Medicamentos    · Woodruff médico le dirá si puede volver a marcus mimi medicamentos y cuándo puede volver a hacerlo. También le dará indicaciones sobre cualquier medicamento nuevo que deba marcus usted.     · Si kaycee aspirina o cualquier otro medicamento que previene los coágulos de [place], pregúntele a woodruff médico si debería volver a tomarlo y en qué momento. Asegúrese de entender exactamente lo que woodruff médico quiere que kenn.     · Sea ochoa con los medicamentos. [place] los analgésicos (medicamentos para el dolor) exactamente aurelia le fueron indicados. ? Si el médico le recetó un analgésico, tómelo según las indicaciones. ? Si no está tomando un analgésico recetado, tome pradip de venta cally, nigel aurelia acetaminofén (Tylenol), ibuprofeno (Advil, Motrin) o naproxeno (Aleve). Diane y siga todas las instrucciones de la [place]. ? No tome dos o más analgésicos al [employer], a menos que el médico se lo haya indicado. Muchos analgésicos contienen acetaminofén, es decir, Tylenol. El exceso de acetaminofén (Tylenol) puede ser dañino.     · Si arroyo médico le recetó antibióticos, tómelos según las indicaciones. No deje de tomarlos por el hecho de sentirse mejor. Debe marcus todos los antibióticos hasta terminarlos.     · Si le parece que el analgésico le está produciendo malestar estomacal:  ? [place] el medicamento después de las comidas (a menos que arroyo médico le haya indicado lo contrario). ? [place] al médico un analgésico diferente. Cuidado de la incisión    · Si tiene tiras de cinta adhesiva sobre el clau (incisión) que hizo el médico, déjeselas puestas layne semana o hasta que se caigan por sí solas.     · Si le cerraron el clau con grapas, tendrá que visitar al médico en 1 o 2 semanas para que se las quiten.     · Lave diariamente la iliana con agua jabonosa tibia y séquela con toques suaves de toalla. La atención de seguimiento es layne parte clave de arroyo tratamiento y seguridad. Asegúrese de hacer y acudir a todas las citas, y llame a arroyo médico si está teniendo problemas. También es layne buena idea saber los resultados de mimi exámenes y mantener layne lista de los medicamentos que kaycee. ¿Cuándo debe pedir ayuda? Llame al 911 en cualquier momento que considere que necesita atención de [place]. Por ejemplo, llame si:    · Se desmayó (perdió el conocimiento).     · Tiene dolor repentino en el pecho y falta de aire, o tose ricky.     · Tiene un munir dolor abdominal.   Llame a arroyo médico ahora mismo o busque atención médica inmediata si:    · Siente el estómago revuelto y no puede retener líquidos en el estómago.     · Tiene signos de un coágulo de [place], tales aurelia:  ? Dolor en la pantorrilla, el muslo, la chava o detrás de la rodilla. ? Enrojecimiento e hinchazón en la pierna o la chava.   · Tiene problemas para orinar o evacuar el intestino, en especial si tiene dolor leve o hinchazón en la parte baja del abdomen.     · La venda sobre la incisión está empapada en ricky de color tamez vivo. Preste especial atención a cualquier cambio en arroyo cliff y asegúrese de comunicarse con arroyo médico si:    · La hinchazón empeora.     · La hinchazón no disminuye. ¿Dónde puede encontrar más información en inglés? Berenda Gens a [email or web address]  Diana Loo Z341 en la búsqueda para aprender más acerca de \"Reparación de hernia: Qué esperar en el hogar. \"  Revisado: 2021               Versión del contenido: 13.2  © 6526-7764 Healthwise, Incorporated. Las instrucciones de cuidado fueron adaptadas bajo licencia por [employer] (which disclaims liability or warranty for this information). Si usted tiene [place] afección médica o sobre estas instrucciones, siempre pregunte a arroyo profesional de cliff. Healthwise, Incorporated niega toda garantía o responsabilidad por arroyo uso de esta información. Patient Discharge Instructions    Idania Scruggs / [id number] : 1965    Admitted 2022 Discharged: 2022     Take Home Medications            · It is important that you take the medication exactly as they are prescribed. · Keep your medication in the bottles provided by the pharmacist and keep a list of the medication names, dosages, and times to be taken in your wallet. · Do not take other medications without consulting your doctor. What to do at Home    Recommended diet: Regular Diet,     Recommended activity: Activity as tolerated, may shower whenever you wish    Tylenol was given at 4663; you may take another dose of tylenol at 2:22pm today.           Follow-up Appointments   Procedures    FOLLOW UP VISIT Appointment in: Two Weeks     Standing Status:   Standing     Number of Occurrences:   1     Order Specific Question:   Appointment in     Answer: Two Weeks           Information obtained by :  I understand that if any problems occur once I am at home I am to contact my physician. I understand and acknowledge receipt of the instructions indicated above.                                                                                                                                            Physician's or R.N.'s Signature                                                                  Date/Time                                                                                                                                              Patient or Representative Signature                                                          Date/Time

## 2022-06-30 NOTE — H&P
Subjective:      Kong Tripp  is a 49 y.o. male who presents for evaluation of RT inguinal hernia. Pt requires tele- today. Pt reports catching falling ladder at work and feeling a pain to the RT groin. Pt endorses immediately noticing a small bulge in the area. He reports pain when coughing or sneezing. Pt was seen and assessed in the ED on 5/13/22 and referred to office. Past Medical History:   Diagnosis Date    Right inguinal hernia 5/18/2022         No past surgical history on file. Social History           Tobacco Use    Smoking status: Never Smoker    Smokeless tobacco: Never Used   Substance Use Topics    Alcohol use: No               Family History   Problem Relation Age of Onset    Asthma Mother                  Current Outpatient Medications on File Prior to Visit   Medication Sig Dispense Refill    acetaminophen (TYLENOL) 650 mg TbER Take 650 mg by mouth every eight (8) hours.  meloxicam (MOBIC) 15 mg tablet Take 1 Tablet by mouth daily as needed for Pain. (Patient not taking: Reported on 5/18/2022) 15 Tablet 0    red yeast rice extract 600 mg cap Take 600 mg by mouth now. (Patient not taking: Reported on 5/18/2022)        cholecalciferol (VITAMIN D3) 1,000 unit cap Take  by mouth daily. (Patient not taking: Reported on 5/18/2022)        Omega-3-DHA-EPA-Fish Oil 1,000 mg (120 mg-180 mg) cap Take  by mouth. (Patient not taking: Reported on 5/18/2022)        Permethrin, Bulk, 92 % liqd Apply daily X 3 days. (Patient not taking: Reported on 5/18/2022) 1 g 1    predniSONE (DELTASONE) 20 mg tablet Prednisone 60 mg x 2 days, 40 mg po x 2 days, 20 mg po x 2 days, 10 mg po x 1 day. (Patient not taking: Reported on 5/18/2022) 13 Tab 0    calcipotriene (DOVONOX) 0.005 % ointment Apply  to affected area two (2) times a day.  (Patient not taking: Reported on 5/18/2022) 60 g 0      No current facility-administered medications on file prior to visit. No Known Allergies     Review of Systems:  Constitutional: No fever or chills  Neurologic: No headache  Eyes: No scleral icterus or irritated eyes  Nose: No nasal pain or drainage  Mouth: No oral lesions or sore throat  Cardiac: No palpations or chest pain  Pulmonary: No cough or shortness or breath  Gastrointestinal: No nausea, emesis, diarrhea, or constipation  Genitourinary: No dysuria  Musculoskeletal: No muscle or joint tenderness  Skin: No rashes or lesions  Psychiatric: No anxiety or depressed mood     Objective:      Visit Vitals  BP 97/62   Pulse 64   Temp 98.3 °F (36.8 °C) (Oral)   Resp 18   Ht 5' 7\" (1.702 m)   Wt 152 lb (68.9 kg)   SpO2 94%   BMI 23.81 kg/m²         Physical Exam:  General: No acute distress, conversant  Eyes: PERRLA, no scleral icterus  HENT: Normocephalic without oral lesions  Neck: Trachea midline without LAD  Cardiac: Normal pulse rate and rhythm  Pulmonary: Symmetric chest rise with normal effort  Abdomen: easily reducible, very tender, small RT inguinal hernia  Skin: Warm without rash  Extremities: No edema or joint stiffness  Psych: Appropriate mood and affect     Labs: No results found for this or any previous visit (from the past 24 hour(s)). Data Review: All previous imaging, testing and lab work was reviewed and interpreted. Assessment and Plan:          ICD-10-CM ICD-9-CM     1. Right inguinal hernia  K40.90 550.90           Discussed their diagnosis thoroughly. Noted that the presence of a hernia is not a determining factor when considering surgical repair. Due to the natural progression of a hernia, this will not heal on its own and may continue to increase in size over time. Since this hernia is bothersome, recommend surgical repair as an outpatient, with possible mesh placement. Described the details of this surgery and discussed what the patient should expect for recovery.  Pt should avoid any heavy lifting for 10-14 days post-operation. All questions were answered. They agree with this plan and will schedule this at their convenience.

## 2022-06-30 NOTE — ANESTHESIA PREPROCEDURE EVALUATION
Relevant Problems   No relevant active problems       Anesthetic History   No history of anesthetic complications            Review of Systems / Medical History  Patient summary reviewed, nursing notes reviewed and pertinent labs reviewed    Pulmonary  Within defined limits                 Neuro/Psych   Within defined limits           Cardiovascular  Within defined limits                     GI/Hepatic/Renal  Within defined limits              Endo/Other  Within defined limits           Other Findings              Physical Exam    Airway  Mallampati: II  TM Distance: 4 - 6 cm         Cardiovascular    Rhythm: regular  Rate: normal         Dental  No notable dental hx       Pulmonary  Breath sounds clear to auscultation               Abdominal  GI exam deferred       Other Findings            Anesthetic Plan    ASA: 2  Anesthesia type: general          Induction: Intravenous  Anesthetic plan and risks discussed with: Patient

## 2022-06-30 NOTE — OP NOTES
[address]   OPERATIVE REPORT    Name:  Radha Lemon  MR#:  [id number]  :  1965  ACCOUNT #:  [de-identified]  DATE OF SERVICE:  2022    PREOPERATIVE DIAGNOSIS:  Right inguinal hernia. POSTOPERATIVE DIAGNOSIS:  Right inguinal hernia. PROCEDURE PERFORMED:  Repair of right inguinal hernia. SURGEON:  Pretty Gamino. Edgar Brooks MD    ASSISTANT:  LEVI Bearden    ANESTHESIA:  General supplemented with Exparel. COMPLICATIONS:  None. SPECIMENS REMOVED:  Hernia sac. IMPLANTS:  Medium plug and patch. ESTIMATED BLOOD LOSS:  Minimal.    DRAINS:  None. FINDINGS:  Small indirect inguinal hernia. PROCEDURE:  With the patient supine and suitably anesthetized, the abdomen was prepared with ChloraPrep and draped as a field. 0.5% Marcaine with epinephrine was infiltrated around the anterior-superior iliac spine and in the region of the incision. An oblique incision was made and carried down through the skin and subcutaneous tissues to the fascia of the external oblique, which was then opened in the direction of its fibers to the external ring. The contents of the cord were encircled by careful blunt and sharp dissections. Cremaster muscles  in the direction of its fibers, and the sac was identified and grasped and then dissected free from the cord and vessels. It was dissected away up to its junction with the peritoneal cavity where it was doubly suture ligated with 2-0 Vicryl and the distal portion amputated. A medium plug was placed to these to recreate the internal ring. A patch was then fashioned and secured to the pubic tubercle, shelving edge of Poupart's ligament and the conjoint tendon. The tabs of the patch were approximated lateral to the cord, underneath the external oblique fascia. Then, the fascia was closed from lateral to medial with 0 Vicryl suture. All the tissues were then infiltrated with Exparel except near the femoral nerve.   The subcutaneous tissues were reapproximated with interrupted Vicryl, and the skin was closed with subcuticular Monocryl followed by Dermabond. At the termination of the procedure, all counts were correct. The patient tolerated this well and was brought to the PACU in satisfactory condition. Tobie Hatchet, MD      [id number]  D:  06/30/2022 9:58  T:  06/30/2022 17:57  JOB #:  [id number]  CC:   Tennille Townsend MD

## 2022-06-30 NOTE — PROGRESS NOTES
Discharge instructions reviewed with patient and family. All questions answered. VSS. Patient wheeled off the unit. Prescriptions sent to pharmacy on [facility].

## 2022-06-30 NOTE — PERIOP NOTES
Dr. Chen Don notified of patient's HR running in the low 40's. Patient arousable and BP WNL. No treatment needed at this time.

## 2022-06-30 NOTE — BRIEF OP NOTE
Brief Postoperative Note    Patient: Kong Tripp  YOB: 1965  MRN: [id number]    Date of Procedure: 6/30/2022     Pre-Op Diagnosis: RIGHT INGUINAL HERNIA    Post-Op Diagnosis: Same as preoperative diagnosis. Procedure(s):  RIGHT INGUINAL HERNIA REPAIR    Surgeon(s):  Rubens Vera MD    Surgical Assistant: Surg Asst-1: Bernard Dumont RN    Anesthesia: General     Estimated Blood Loss (mL): Minimal    Complications: None    Specimens:   ID Type Source Tests Collected by Time Destination   1 : HERNIA SAC Fresh Hernia Sac  Rubens Vera MD 6/30/2022 [facility]        Implants:   Implant Name Type Inv. Item Serial No.  Lot No. LRB No. Used Action   PLUG CHELA M W1.3XL1. 55IN INGUINAL POLYPR REP PRESHAPED - [address] M W1.3XL1. 55IN INGUINAL POLYPR REP PRESHAPED [id number] BARD DAVOL_WD [id number] Right 1 Implanted       Drains: * No LDAs found *    Findings: small indirect hernia    Electronically Signed by Ramya Barrett MD on 6/30/2022 at 9:54 AM  [id number]

## 2022-07-15 ENCOUNTER — TELEPHONE (OUTPATIENT)
Dept: SURGERY | Age: 57
End: 2022-07-15

## 2022-07-15 ENCOUNTER — OFFICE VISIT (OUTPATIENT)
Dept: SURGERY | Age: 57
End: 2022-07-15
Payer: OTHER MISCELLANEOUS

## 2022-07-15 VITALS
WEIGHT: 148.2 LBS | DIASTOLIC BLOOD PRESSURE: 62 MMHG | OXYGEN SATURATION: 97 % | RESPIRATION RATE: 18 BRPM | HEART RATE: 57 BPM | TEMPERATURE: 98.1 F | BODY MASS INDEX: 22.46 KG/M2 | SYSTOLIC BLOOD PRESSURE: 102 MMHG | HEIGHT: 68 IN

## 2022-07-15 DIAGNOSIS — Z09 POSTOPERATIVE EXAMINATION: Primary | ICD-10-CM

## 2022-07-15 DIAGNOSIS — K59.00 CONSTIPATION, UNSPECIFIED CONSTIPATION TYPE: ICD-10-CM

## 2022-07-15 PROCEDURE — 99024 POSTOP FOLLOW-UP VISIT: CPT

## 2022-07-15 RX ORDER — POLYETHYLENE GLYCOL 3350 17 G/17G
17 POWDER, FOR SOLUTION ORAL DAILY
Qty: 30 PACKET | Refills: 0 | Status: SHIPPED | OUTPATIENT
Start: 2022-07-15

## 2022-07-15 NOTE — PROGRESS NOTES
1. Have you been to the ER, urgent care clinic since your last visit? Hospitalized since your last visit? No    2. Have you seen or consulted any other health care providers outside of the [address] [place] [address] since your last visit? Include any pap smears or colon screening.  No

## 2022-07-15 NOTE — LETTER
NOTIFICATION RETURN TO WORK    7/15/2022 11:27 AM    Mr. Katiuska Florez  [facility] [id number]      To Whom It May Concern:    Katiuska Florez is currently under the care of Sarahy Wyman. He will return to work on: August 15th, 2022. If there are questions or concerns please have the patient contact our office.         Sincerely,            Liz Mars NP

## 2022-07-15 NOTE — PROGRESS NOTES
*  used for visit via tablet.  Hawk Moura # [id number]    Subjective:     Margaret Beckham is a 49 y.o. male presents for postop care 2 weeks s/p RIGHT INGUINAL HERNIA REPAIR. Appetite is good. No nausea/vomiting. Tolerating a regular diet. Bowel movements are constipated. He has tried drinking prune juice and eating papaya which has not really helped much. Reports his last bowel movement was 2 days ago  The patient is voiding without difficulty. Patient states he only has pain when walking or moving around. Reports pain is tolerable without any pain medication. Patient denies fever, chest pain, or shortness of breath. Review of Systems:  A comprehensive review of systems was negative except for that written above      Mr. Iveth Holm has a reminder for a \"due or due soon\" health maintenance. I have asked that he contact his primary care provider for follow-up on this health maintenance. Objective:     Visit Vitals  /62 (BP 1 Location: Left arm, BP Patient Position: Sitting, BP Cuff Size: Small adult)   Pulse (!) 57 Comment: no c/o distress   Temp 98.1 °F (36.7 °C) (Oral)   Resp 18   Ht 5' 7.5\" (1.715 m)   Wt 148 lb 3.2 oz (67.2 kg)   SpO2 97%   BMI 22.87 kg/m²       General: alert, cooperative, no distress, appears stated age  CV: Regular rate and rhythm  Pulmonary: Lungs clear to auscultation   Abdomen:soft, bowel sounds active, non-tender  Incision: Mild swelling ,healing well, no drainage, no erythema, no dehiscence, incision well approximated    Assessment:       ICD-10-CM ICD-9-CM    1. Postoperative examination  Z09 V67.00    2. Constipation, unspecified constipation type  K59.00 564.00 polyethylene glycol (MIRALAX) 17 gram packet       Plan:   2+ weeks s/p RIGHT INGUINAL HERNIA REPAIR  Reviewed pathology with patient   Hernia sac; excision:        Reactive mesothelium-lined benign fibromembranous, vascular and   adipose tissue, consistent with hernia sac.      Wound care discussed. May get incisions wet. Moisturize as needed. No lifting greater than 15 to 20 pounds X 4 weeks. Then may advance activity as tolerated. Patient states his his job requires a lot of heavy lifting. He may return to work full duty on August 15, 2022. Recommended use of supportive wear and heating pad for any additional soreness. MiraLAX prescribed for constipation. Activity  educational materials were provided. Juan Agosto verbalized understanding and questions were answered to the best of my knowledge and ability. Instructed patient to call with any questions or concerns.   Discharged from surgical care with prn follow up         > 20 minutes were spent with patient with greater than 50% of that time spent face to face counseling    Nereyda Mayorga NP  [facility]   7/15/2022

## 2022-07-15 NOTE — PATIENT INSTRUCTIONS
-  No lifting greater than 15 to 20 pounds X 4 weeks then may advance activity as tolerated. May return to work full duty on August 15th, 2022    -  Ok to bath as normal and you may get incision wet. Glue will fall off on its own. May moisturize incision sites. -  May use supportive wear     -  Take 1 packet of MiraLAX daily for constipation    -  May use heating pad for any additional Abdominal soreness    -  Please call with any questions or concerns     -  Follow up only as needed     Marshallese translation    - No levantar más de 15 a 20 libras X 4 semanas, luego puede avanzar en la actividad según lo tolere. Puede volver a trabajar con todas mimi funciones el 13 de agosto de 2022    - Está tristian bañarse normalmente y es posible que se moje la incisión. El pegamento se caerá solo. Puede humectar los sitios de incisión. - Puede usar ropa [place] 1 paquete de MiraLAX diariamente para el estreñimiento    - Puede usar layne almohadilla térmica para cualquier dolor abdominal adicional    - Por favor llame con cualquier pregunta o inquietud.     - Seguimiento solo cuando sea necesario

## 2022-07-15 NOTE — TELEPHONE ENCOUNTER
I called Paige Gonzáles back and left a message informing her who I was and where I was returning her call. I did also let her know I read the NP's note from today extending out of work until 8/15/22.

## 2022-07-15 NOTE — TELEPHONE ENCOUNTER
Nic Hollis from [facility] who handled this patient's workers' comp claim states she has faxed over paperwork regarding putting this patient on light duty at work. She says she does not know why this patient would be out of work for a month for a small hernia. She is requesting a call back at [phone].

## 2022-08-05 ENCOUNTER — TELEPHONE (OUTPATIENT)
Dept: SURGERY | Age: 57
End: 2022-08-05

## 2022-08-05 NOTE — TELEPHONE ENCOUNTER
[employer] [place] with the patient's workmen's comp called stating that they received letter that the patient can't return to work until 8/15 and would like to know if the patient can return before 8/15 with light duty.

## 2022-08-05 NOTE — TELEPHONE ENCOUNTER
I spoke with the NP. I informed her the patient job wants to know if he can come back to work earlier than 8/15/22 with restrictions. She told me yes. He is to continue with no heavy lifting greater than 5 lbs., no pushing or pulling X 1 week. I spoke with Adry Mcintyre at [facility]. She asked to have this documented in the form of a letter. The letter was faxed to the number given [phone] and a confirmation was received.

## 2022-08-11 ENCOUNTER — TELEPHONE (OUTPATIENT)
Dept: SURGERY | Age: 57
End: 2022-08-11

## 2022-08-11 NOTE — TELEPHONE ENCOUNTER
Reviewed notes Mr Pat Beatty had Right inguinal hernia repair on 6/30/22. He was last seen by TAMANNA Palmer on 7/15/22. She recommended he  May return to work full duty on August 15th, 2022  No lifting greater than 15 to 20 pounds X 4 weeks then may advance activity as tolerated. Robert Slater from [facility] called and stated that she needs a release for the patient to work full duty starting Monday August 15. Her fax number is [phone].

## 2022-08-11 NOTE — TELEPHONE ENCOUNTER
Release letter faxed to Moe Butler from [facility]  Monday August 15 fax number is [phone] with confirmation. I called Moe Butler informed her the letter has been faxed as requested.

## 2022-08-11 NOTE — LETTER
NOTIFICATION RETURN TO WORK / SCHOOL    8/11/2022 4:49 PM    Mr. Angelica Blancas  [facility] [phone]      To Whom It May Concern:    Angelica Blancas is currently under the care of Sarahy Wyman. He will return to work on 8/15/22 without any restrictions. If there are questions or concerns please have the patient contact our office.         Sincerely,      Theo Lee NP

## 2022-08-11 NOTE — TELEPHONE ENCOUNTER
Ab Morillo from [facility] called and stated that she needs a release for the patient to work full duty starting Monday August 15. Her fax number is [phone].

## (undated) DEVICE — SUTURE VCRL SZ 1 L36IN ABSRB VLT L48MM CTX 1/2 CIR J371H

## (undated) DEVICE — SUTURE VCRL SZ 0 L27IN ABSRB UD L26MM CT-2 1/2 CIR J270H

## (undated) DEVICE — DERMABOND SKIN ADH 0.7ML -- DERMABOND ADVANCED 12/BX

## (undated) DEVICE — DRAPE,UTILTY,TAPE,15X26, 4EA/PK: Brand: MEDLINE

## (undated) DEVICE — SUTURE VCRL SZ 3-0 L54IN ABSRB VLT LIGAPAK REEL NDL J205G

## (undated) DEVICE — PREP SKN CHLRAPRP APL 26ML STR --

## (undated) DEVICE — BLADE ASSEMB CLP HAIR FINE --

## (undated) DEVICE — HANDLE LT SNAP ON ULT DURABLE LENS FOR TRUMPF ALC DISPOSABLE

## (undated) DEVICE — REM POLYHESIVE ADULT PATIENT RETURN ELECTRODE: Brand: VALLEYLAB

## (undated) DEVICE — BASIN ST MAJOR-NO CAUTERY: Brand: MEDLINE INDUSTRIES, INC.

## (undated) DEVICE — SYR 10ML LUER LOK 1/5ML GRAD --

## (undated) DEVICE — SUTURE VCRL SZ 2-0 L27IN ABSRB UD L26MM CT-2 1/2 CIR J269H

## (undated) DEVICE — PENCIL SMK EVAC 10 FT BLADE ELECTRD ROCKER FOR TELSCP

## (undated) DEVICE — HYPODERMIC SAFETY NEEDLE: Brand: MAGELLAN

## (undated) DEVICE — PACK,LAPAROTOMY,2 REINFORCED GOWNS: Brand: MEDLINE

## (undated) DEVICE — SUTURE VCRL SZ 2-0 L27IN ABSRB UD L26MM SH 1/2 CIR J417H

## (undated) DEVICE — GARMENT,MEDLINE,DVT,INT,CALF,MED, GEN2: Brand: MEDLINE

## (undated) DEVICE — GLOVE ORANGE PI 7 1/2   MSG9075

## (undated) DEVICE — SUTURE MCRYL SZ 4-0 L27IN ABSRB UD L19MM PS-2 1/2 CIR PRIM Y426H

## (undated) DEVICE — HYPODERMIC SAFETY NEEDLE: Brand: MONOJECT

## (undated) DEVICE — SOLUTION IRRIG 1000ML 0.9% SOD CHL USP POUR PLAS BTL

## (undated) DEVICE — SUTURE VCRL SZ 3-0 L27IN ABSRB UD L26MM SH 1/2 CIR J416H